# Patient Record
Sex: MALE | Race: WHITE | Employment: UNEMPLOYED | URBAN - METROPOLITAN AREA
[De-identification: names, ages, dates, MRNs, and addresses within clinical notes are randomized per-mention and may not be internally consistent; named-entity substitution may affect disease eponyms.]

---

## 2021-01-21 ENCOUNTER — APPOINTMENT (OUTPATIENT)
Dept: ULTRASOUND IMAGING | Age: 46
End: 2021-01-21
Payer: COMMERCIAL

## 2021-01-21 VITALS
HEART RATE: 122 BPM | SYSTOLIC BLOOD PRESSURE: 153 MMHG | HEIGHT: 70 IN | WEIGHT: 165 LBS | BODY MASS INDEX: 23.62 KG/M2 | TEMPERATURE: 97.7 F | DIASTOLIC BLOOD PRESSURE: 98 MMHG | RESPIRATION RATE: 16 BRPM | OXYGEN SATURATION: 100 %

## 2021-01-21 LAB
AMPHETAMINE SCREEN, URINE: POSITIVE
BARBITURATE SCREEN URINE: ABNORMAL
BENZODIAZEPINE SCREEN, URINE: ABNORMAL
BILIRUBIN URINE: NEGATIVE
BLOOD, URINE: NEGATIVE
CANNABINOID SCREEN URINE: ABNORMAL
CLARITY: CLEAR
COCAINE METABOLITE SCREEN URINE: POSITIVE
COLOR: YELLOW
GLUCOSE URINE: NEGATIVE MG/DL
KETONES, URINE: NEGATIVE MG/DL
LEUKOCYTE ESTERASE, URINE: NEGATIVE
Lab: ABNORMAL
METHADONE SCREEN, URINE: ABNORMAL
MICROSCOPIC EXAMINATION: NORMAL
NITRITE, URINE: NEGATIVE
OPIATE SCREEN URINE: ABNORMAL
OXYCODONE URINE: ABNORMAL
PH UA: 6.5
PH UA: 6.5 (ref 5–8)
PHENCYCLIDINE SCREEN URINE: ABNORMAL
PROPOXYPHENE SCREEN: ABNORMAL
PROTEIN UA: NEGATIVE MG/DL
SPECIFIC GRAVITY UA: 1.02 (ref 1–1.03)
URINE TYPE: NORMAL
UROBILINOGEN, URINE: 0.2 E.U./DL

## 2021-01-21 PROCEDURE — 76870 US EXAM SCROTUM: CPT

## 2021-01-21 PROCEDURE — 80307 DRUG TEST PRSMV CHEM ANLYZR: CPT

## 2021-01-21 PROCEDURE — 87591 N.GONORRHOEAE DNA AMP PROB: CPT

## 2021-01-21 PROCEDURE — 99283 EMERGENCY DEPT VISIT LOW MDM: CPT

## 2021-01-21 PROCEDURE — 96372 THER/PROPH/DIAG INJ SC/IM: CPT

## 2021-01-21 PROCEDURE — 87491 CHLMYD TRACH DNA AMP PROBE: CPT

## 2021-01-21 PROCEDURE — 81003 URINALYSIS AUTO W/O SCOPE: CPT

## 2021-01-21 ASSESSMENT — PAIN DESCRIPTION - DESCRIPTORS: DESCRIPTORS: SHARP

## 2021-01-21 ASSESSMENT — PAIN DESCRIPTION - PAIN TYPE: TYPE: ACUTE PAIN

## 2021-01-21 ASSESSMENT — PAIN SCALES - GENERAL: PAINLEVEL_OUTOF10: 2

## 2021-01-22 ENCOUNTER — HOSPITAL ENCOUNTER (EMERGENCY)
Age: 46
Discharge: HOME | End: 2021-01-22
Attending: EMERGENCY MEDICINE
Payer: COMMERCIAL

## 2021-01-22 DIAGNOSIS — N43.1 PYOCELE: Primary | ICD-10-CM

## 2021-01-22 LAB
A/G RATIO: 1.1 (ref 1.1–2.2)
ALBUMIN SERPL-MCNC: 4.3 G/DL (ref 3.4–5)
ALP BLD-CCNC: 125 U/L (ref 40–129)
ALT SERPL-CCNC: 105 U/L (ref 10–40)
ANION GAP SERPL CALCULATED.3IONS-SCNC: 7 MMOL/L (ref 3–16)
AST SERPL-CCNC: 95 U/L (ref 15–37)
BASOPHILS ABSOLUTE: 0.2 K/UL (ref 0–0.2)
BASOPHILS RELATIVE PERCENT: 1.6 %
BILIRUB SERPL-MCNC: <0.2 MG/DL (ref 0–1)
BUN BLDV-MCNC: 12 MG/DL (ref 7–20)
CALCIUM SERPL-MCNC: 9.6 MG/DL (ref 8.3–10.6)
CHLORIDE BLD-SCNC: 99 MMOL/L (ref 99–110)
CO2: 30 MMOL/L (ref 21–32)
CREAT SERPL-MCNC: 0.8 MG/DL (ref 0.9–1.3)
EOSINOPHILS ABSOLUTE: 0.1 K/UL (ref 0–0.6)
EOSINOPHILS RELATIVE PERCENT: 0.6 %
GFR AFRICAN AMERICAN: >60
GFR NON-AFRICAN AMERICAN: >60
GLOBULIN: 3.9 G/DL
GLUCOSE BLD-MCNC: 84 MG/DL (ref 70–99)
HCT VFR BLD CALC: 42.5 % (ref 40.5–52.5)
HEMOGLOBIN: 14.5 G/DL (ref 13.5–17.5)
LACTIC ACID: 1.4 MMOL/L (ref 0.4–2)
LYMPHOCYTES ABSOLUTE: 2.1 K/UL (ref 1–5.1)
LYMPHOCYTES RELATIVE PERCENT: 22.1 %
MCH RBC QN AUTO: 29.4 PG (ref 26–34)
MCHC RBC AUTO-ENTMCNC: 34.2 G/DL (ref 31–36)
MCV RBC AUTO: 86 FL (ref 80–100)
MONOCYTES ABSOLUTE: 0.5 K/UL (ref 0–1.3)
MONOCYTES RELATIVE PERCENT: 5.8 %
NEUTROPHILS ABSOLUTE: 6.5 K/UL (ref 1.7–7.7)
NEUTROPHILS RELATIVE PERCENT: 69.9 %
PDW BLD-RTO: 12.9 % (ref 12.4–15.4)
PLATELET # BLD: 267 K/UL (ref 135–450)
PMV BLD AUTO: 9 FL (ref 5–10.5)
POTASSIUM SERPL-SCNC: 4.2 MMOL/L (ref 3.5–5.1)
PROCALCITONIN: 0.1 NG/ML (ref 0–0.15)
RBC # BLD: 4.94 M/UL (ref 4.2–5.9)
SODIUM BLD-SCNC: 136 MMOL/L (ref 136–145)
TOTAL PROTEIN: 8.2 G/DL (ref 6.4–8.2)
WBC # BLD: 9.3 K/UL (ref 4–11)

## 2021-01-22 PROCEDURE — 84145 PROCALCITONIN (PCT): CPT

## 2021-01-22 PROCEDURE — 6370000000 HC RX 637 (ALT 250 FOR IP): Performed by: EMERGENCY MEDICINE

## 2021-01-22 PROCEDURE — 2580000003 HC RX 258

## 2021-01-22 PROCEDURE — 2580000003 HC RX 258: Performed by: EMERGENCY MEDICINE

## 2021-01-22 PROCEDURE — 83605 ASSAY OF LACTIC ACID: CPT

## 2021-01-22 PROCEDURE — 6360000002 HC RX W HCPCS: Performed by: EMERGENCY MEDICINE

## 2021-01-22 PROCEDURE — 80053 COMPREHEN METABOLIC PANEL: CPT

## 2021-01-22 PROCEDURE — 85025 COMPLETE CBC W/AUTO DIFF WBC: CPT

## 2021-01-22 RX ORDER — AZITHROMYCIN 250 MG/1
1000 TABLET, FILM COATED ORAL ONCE
Status: COMPLETED | OUTPATIENT
Start: 2021-01-22 | End: 2021-01-22

## 2021-01-22 RX ORDER — 0.9 % SODIUM CHLORIDE 0.9 %
1000 INTRAVENOUS SOLUTION INTRAVENOUS ONCE
Status: DISCONTINUED | OUTPATIENT
Start: 2021-01-22 | End: 2021-01-22 | Stop reason: HOSPADM

## 2021-01-22 RX ORDER — CEFTRIAXONE 1 G/1
500 INJECTION, POWDER, FOR SOLUTION INTRAMUSCULAR; INTRAVENOUS ONCE
Status: COMPLETED | OUTPATIENT
Start: 2021-01-22 | End: 2021-01-22

## 2021-01-22 RX ORDER — LEVOFLOXACIN 750 MG/1
750 TABLET ORAL DAILY
Qty: 7 TABLET | Refills: 0 | Status: SHIPPED | OUTPATIENT
Start: 2021-01-22 | End: 2021-01-29

## 2021-01-22 RX ORDER — METRONIDAZOLE 250 MG/1
2000 TABLET ORAL ONCE
Status: COMPLETED | OUTPATIENT
Start: 2021-01-22 | End: 2021-01-22

## 2021-01-22 RX ADMIN — METRONIDAZOLE 2000 MG: 250 TABLET ORAL at 01:21

## 2021-01-22 RX ADMIN — CEFTRIAXONE SODIUM 500 MG: 1 INJECTION, POWDER, FOR SOLUTION INTRAMUSCULAR; INTRAVENOUS at 01:22

## 2021-01-22 RX ADMIN — WATER: 1 INJECTION INTRAMUSCULAR; INTRAVENOUS; SUBCUTANEOUS at 01:22

## 2021-01-22 RX ADMIN — AZITHROMYCIN 1000 MG: 250 TABLET, FILM COATED ORAL at 01:21

## 2021-01-22 NOTE — ED PROVIDER NOTES
[address] [facility]      CHIEF COMPLAINT  Groin Pain (Pt. states he has had testicle pain for several weeks. )       HISTORY OF PRESENT ILLNESS  Kassie Fernandes is a 54 y.o. male  who presents to the ED for evaluation of groin pain. Patient reports having sex with a new partner about 3 weeks ago. Since then, patient reports he has been having increasing discomfort to the left testicle. Patient reports he has been having aching, generally uncomfortable sensation to the left testicle. Denies any discoloration. Denies having associated fevers, chills, dysuria, hematuria, urinary urgency, or urinary frequency. Denies any discharges. Patient states that he would like to be STD tested. Denies having abdominal pain. No other complaints, modifying factors or associated symptoms. I have reviewed the following from the nursing documentation. History reviewed. No pertinent past medical history. History reviewed. No pertinent surgical history. History reviewed. No pertinent family history.   Social History     Socioeconomic History    Marital status: Single     Spouse name: Not on file    Number of children: Not on file    Years of education: Not on file    Highest education level: Not on file   Occupational History    Not on file   Social Needs    Financial resource strain: Not on file    Food insecurity     Worry: Not on file     Inability: Not on file    Transportation needs     Medical: Not on file     Non-medical: Not on file   Tobacco Use    Smoking status: Current Every Day Smoker     Packs/day: 1.00    Smokeless tobacco: Never Used   Substance and Sexual Activity    Alcohol use: Not Currently    Drug use: Yes     Frequency: 3.0 times per week     Types: Marijuana, Opiates     Sexual activity: Yes     Partners: Female   Lifestyle    Physical activity     Days per week: Not on file     Minutes per session: Not on file    Stress: Not on file   Relationships    Social connections Talks on phone: Not on file     Gets together: Not on file     Attends [employer] service: Not on file     Active member of club or organization: Not on file     Attends meetings of clubs or organizations: Not on file     Relationship status: Not on file    Intimate partner violence     Fear of current or ex partner: Not on file     Emotionally abused: Not on file     Physically abused: Not on file     Forced sexual activity: Not on file   Other Topics Concern    Not on file   Social History Narrative    Not on file     No current facility-administered medications for this encounter. Current Outpatient Medications   Medication Sig Dispense Refill    levoFLOXacin (LEVAQUIN) 750 MG tablet Take 1 tablet by mouth daily for 7 days 7 tablet 0     No Known Allergies    REVIEW OF SYSTEMS  10 systems reviewed, pertinent positives per HPI otherwise noted to be negative. PHYSICAL EXAM  BP (!) 153/98   Pulse 122   Temp 97.7 °F (36.5 °C) (Tympanic)   Resp 16   Ht 5' 10\" (1.778 m)   Wt 165 lb (74.8 kg)   SpO2 100%   BMI 23.68 kg/m²    GENERAL APPEARANCE: Awake and alert. Anxious. agitated  HENT: Normocephalic. Atraumatic. PERRL. EOMI. No facial droop. HEART/CHEST: RRR. LUNGS: Respirations unlabored. CTAB. ABDOMEN: Soft, non-distended abdomen. Non tender to palpation. No guarding. No rebound. : scrotal edema. No discloration. No crepitus. No inguinal lymphadenopathy. Tenderness to palpation over the left testicle. No ulcerations. No discharge. EXTREMITIES: No gross deformities. Moving all extremities. SKIN: Warm and dry. No acute rashes. NEUROLOGICAL: Alert and oriented. No gross facial drooping. Answering questions appropriately. Moving all extremities.   PSYCHIATRIC: Anxious    LABS  Results for orders placed or performed during the hospital encounter of 01/22/21   Urinalysis   Result Value Ref Range    Color, UA Yellow Straw/Yellow    Clarity, UA Clear Clear    Glucose, Ur Negative Negative mg/dL Bilirubin Urine Negative Negative    Ketones, Urine Negative Negative mg/dL    Specific Gravity, UA 1.020 1.005 - 1.030    Blood, Urine Negative Negative    pH, UA 6.5 5.0 - 8.0    Protein, UA Negative Negative mg/dL    Urobilinogen, Urine 0.2 <2.0 E.U./dL    Nitrite, Urine Negative Negative    Leukocyte Esterase, Urine Negative Negative    Microscopic Examination Not Indicated     Urine Type NotGiven    Urine Drug Screen   Result Value Ref Range    Amphetamine Screen, Urine POSITIVE (A) Negative <1000ng/mL    Barbiturate Screen, Ur Neg Negative <200 ng/mL    Benzodiazepine Screen, Urine Neg Negative <200 ng/mL    Cannabinoid Scrn, Ur Neg Negative <50 ng/mL    Cocaine Metabolite Screen, Urine POSITIVE (A) Negative <300 ng/mL    Opiate Scrn, Ur Neg Negative <300 ng/mL    PCP Screen, Urine Neg Negative <25 ng/mL    Methadone Screen, Urine Neg Negative <300 ng/mL    Propoxyphene Scrn, Ur Neg Negative <300 ng/mL    Oxycodone Urine Neg Negative <100 ng/ml    pH, UA 6.5     Drug Screen Comment: see below    CBC Auto Differential   Result Value Ref Range    WBC 9.3 4.0 - 11.0 K/uL    RBC 4.94 4.20 - 5.90 M/uL    Hemoglobin 14.5 13.5 - 17.5 g/dL    Hematocrit 42.5 40.5 - 52.5 %    MCV 86.0 80.0 - 100.0 fL    MCH 29.4 26.0 - 34.0 pg    MCHC 34.2 31.0 - 36.0 g/dL    RDW 12.9 12.4 - 15.4 %    Platelets 552 412 - 764 K/uL    MPV 9.0 5.0 - 10.5 fL    Neutrophils % 69.9 %    Lymphocytes % 22.1 %    Monocytes % 5.8 %    Eosinophils % 0.6 %    Basophils % 1.6 %    Neutrophils Absolute 6.5 1.7 - 7.7 K/uL    Lymphocytes Absolute 2.1 1.0 - 5.1 K/uL    Monocytes Absolute 0.5 0.0 - 1.3 K/uL    Eosinophils Absolute 0.1 0.0 - 0.6 K/uL    Basophils Absolute 0.2 0.0 - 0.2 K/uL   Comprehensive Metabolic Panel   Result Value Ref Range    Sodium 136 136 - 145 mmol/L    Potassium 4.2 3.5 - 5.1 mmol/L    Chloride 99 99 - 110 mmol/L    CO2 30 21 - 32 mmol/L    Anion Gap 7 3 - 16    Glucose 84 70 - 99 mg/dL    BUN 12 7 - 20 mg/dL    CREATININE 0.8 (L) 0.9 - 1.3 mg/dL    GFR Non-African American >60 >60    GFR African American >60 >60    Calcium 9.6 8.3 - 10.6 mg/dL    Total Protein 8.2 6.4 - 8.2 g/dL    Alb 4.3 3.4 - 5.0 g/dL    Albumin/Globulin Ratio 1.1 1.1 - 2.2    Total Bilirubin <0.2 0.0 - 1.0 mg/dL    Alkaline Phosphatase 125 40 - 129 U/L     (H) 10 - 40 U/L    AST 95 (H) 15 - 37 U/L    Globulin 3.9 g/dL   Procalcitonin   Result Value Ref Range    Procalcitonin 0.10 0.00 - 0.15 ng/mL   Lactic Acid, Plasma   Result Value Ref Range    Lactic Acid 1.4 0.4 - 2.0 mmol/L       I have reviewed all labs for this visit. RADIOLOGY  Us Scrotum And Testicles    Result Date: 1/22/2021  EXAMINATION: ULTRASOUND OF THE SCROTUM/TESTICLES WITH COLOR DOPPLER FLOW EVALUATION 1/21/2021 COMPARISON: None. HISTORY: ORDERING SYSTEM PROVIDED HISTORY: scrotal pain TECHNOLOGIST PROVIDED HISTORY: Reason for exam:->scrotal pain Reason for Exam: scrotal pain Acuity: Unknown Type of Exam: Unknown FINDINGS: Measurements: Right testicle: 4.5 cm x 2.9 cm x 2.8 cm Left testicle: 3.9 cm x 1.4 cm x 1.7 cm Right: Grey scale: The right testicle demonstrates normal homogeneous echotexture without focal lesion. 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 Doppler Evaluation:  There is normal arterial and venous Doppler flow within the testicle. Scrotal Sac: There is a simple right hydrocele. Epididymis:  No acute abnormality. Left: Grey scale: The left testicle demonstrates normal homogeneous echotexture without focal lesion. 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 There is a large complex cystic septated mass directly adjacent to and compressing the left testicle. Doppler Evaluation:  There is increased arterial and venous Doppler flow within the testicle. Scrotal Sac: There is a large complex septated cystic mass in the left scrotum measuring 4.8 cm x 3.7 cm x 5.2 cm compressing the left testicle. Epididymis:  No acute abnormality.      There is a large complex septated cystic mass measuring up to 5.2 cm compressing the left testicle. Differential diagnosis includes complex hydrocele, possibly a pyocele or hematocele. Pyocele should be considered in light of the increased Doppler flow in the left testicle suggesting orchitis. A multi-cystic testicular tumor is not excluded. 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 Simple right hydrocele. ED COURSE/MDM  Patient seen and evaluated. At presentation, patient was anxious, agitated, intermittently cooperative with history taking, afebrile, hypertensive to 536 systolic, tachycardic to 896, and satting well on room air. Exam remarkable for testicular swelling and tenderness of abrasion over the left scrotum without discoloration, discharge, or ulcerations. Differential diagnosis includes testicular torsion, abscess, Gurpreet's gangrene, orchitis, epididymitis, among others. Given this, scrotal ultrasound, urinalysis, gonorrhea/chlamydia, CBC, CMP procalcitonin, and lactate obtained. The patient had no further leukocytosis, hemoglobin of 13.5, creatinine 0.8, mild transaminitis with , AST 95, procalcitonin of 0.1, lactate of 1.4.  UDS showed positive amphetamine and cocaine which I suspect is the cause for patient's agitation and tachycardia. Patient given empiric STD treatment with ceftriaxone, azithromycin, and metronidazole in the emergency department. Ultrasound showed palpable flow, and a large complex septated cystic mass measuring up to 5.2 cm complex in the left testicle with increased flow to the left testicle suggesting orchitis. Radiologist differential diagnosis includes complex hydrocele, possibly a pyocele or hematocele. Given this, urologist on-call, Dr. Ady Wakefield was consulted who recommended admission to the hospitalist for antibiotics and urology to see in the morning. However, discussed this with patient and patient wished to be discharged.   Discussed the indications for admission and the potential consequences of worsening scrotal pyocele which may result in scrotal loss/death. Patient verbalized understanding and insisted on leaving [address] Patient was given a prescription of Levaquin and referral for urology and instructed to follow-up tomorrow. Patient verbalized understanding and stated that he will follow up with urology. Patient left AGAINST MEDICAL ADVICE. I provided at least 35 minutes of critical care excluding separately billable procedures. Pt was seen during the [place] 19 pandemic. Appropriate PPE worn by ME during patient encounters. Pt seen during a time with constrained hospital bed capacity and other potential inpatient and outpatient resources were constrained due to the viral pandemic. During the patient's ED course, the patient was given:  Medications   cefTRIAXone (ROCEPHIN) injection 500 mg (500 mg Intramuscular Given 1/22/21 0122)   azithromycin (ZITHROMAX) tablet 1,000 mg (1,000 mg Oral Given 1/22/21 0121)   metroNIDAZOLE (FLAGYL) tablet 2,000 mg (2,000 mg Oral Given 1/22/21 0121)   sterile water injection (  Given 1/22/21 0122)        CLINICAL IMPRESSION  1. Pyocele        Blood pressure (!) 153/98, pulse 122, temperature 97.7 °F (36.5 °C), temperature source Tympanic, resp. rate 16, height 5' 10\" (1.778 m), weight 165 lb (74.8 kg), SpO2 100 %. DISPOSITION  Carina Shelling -left AGAINST MEDICAL ADVICE. Patient was given scripts for the following medications. I counseled patient how to take these medications. Discharge Medication List as of 1/22/2021  2:11 AM      START taking these medications    Details   levoFLOXacin (LEVAQUIN) 750 MG tablet Take 1 tablet by mouth daily for 7 days, Disp-7 tablet, R-0Print             Follow-up with:  Laz Hoff MD  [id number] [id number] [phone]            DISCLAIMER: This chart was created using Dragon and tachycardia. Software.   Efforts were made by me to ensure accuracy, however some errors may be present due to limitations of this technology and occasionally words are not transcribed correctly.        Moe Pino MD  01/22/21 [id number]

## 2021-01-26 LAB
C. TRACHOMATIS DNA ,URINE: NEGATIVE
N. GONORRHOEAE DNA, URINE: NEGATIVE